# Patient Record
Sex: MALE | Race: WHITE | Employment: OTHER
[De-identification: names, ages, dates, MRNs, and addresses within clinical notes are randomized per-mention and may not be internally consistent; named-entity substitution may affect disease eponyms.]

---

## 2017-01-01 ENCOUNTER — HOME CARE VISIT (OUTPATIENT)
Dept: SCHEDULING | Facility: HOME HEALTH | Age: 82
End: 2017-01-01
Payer: MEDICARE

## 2017-01-01 ENCOUNTER — HOME HEALTH ADMISSION (OUTPATIENT)
Dept: HOME HEALTH SERVICES | Facility: HOME HEALTH | Age: 82
End: 2017-01-01
Payer: MEDICARE

## 2017-01-01 ENCOUNTER — APPOINTMENT (OUTPATIENT)
Dept: CT IMAGING | Age: 82
End: 2017-01-01
Attending: EMERGENCY MEDICINE
Payer: MEDICARE

## 2017-01-01 ENCOUNTER — HOSPITAL ENCOUNTER (OUTPATIENT)
Age: 82
Setting detail: OBSERVATION
Discharge: HOME HEALTH | End: 2017-03-28
Attending: EMERGENCY MEDICINE | Admitting: INTERNAL MEDICINE
Payer: MEDICARE

## 2017-01-01 ENCOUNTER — APPOINTMENT (OUTPATIENT)
Dept: GENERAL RADIOLOGY | Age: 82
End: 2017-01-01
Attending: EMERGENCY MEDICINE
Payer: MEDICARE

## 2017-01-01 VITALS
DIASTOLIC BLOOD PRESSURE: 70 MMHG | HEIGHT: 69 IN | TEMPERATURE: 97.3 F | HEART RATE: 71 BPM | RESPIRATION RATE: 22 BRPM | BODY MASS INDEX: 24.94 KG/M2 | OXYGEN SATURATION: 91 % | WEIGHT: 168.4 LBS | SYSTOLIC BLOOD PRESSURE: 113 MMHG

## 2017-01-01 VITALS
RESPIRATION RATE: 16 BRPM | HEART RATE: 70 BPM | SYSTOLIC BLOOD PRESSURE: 106 MMHG | OXYGEN SATURATION: 96 % | DIASTOLIC BLOOD PRESSURE: 64 MMHG | TEMPERATURE: 97 F

## 2017-01-01 DIAGNOSIS — R53.81 DEBILITY: Primary | ICD-10-CM

## 2017-01-01 DIAGNOSIS — R53.1 WEAKNESS: ICD-10-CM

## 2017-01-01 LAB
ALBUMIN SERPL BCP-MCNC: 3 G/DL (ref 3.2–4.6)
ALBUMIN/GLOB SERPL: 0.6 {RATIO} (ref 1.2–3.5)
ALP SERPL-CCNC: 227 U/L (ref 50–136)
ALT SERPL-CCNC: 19 U/L (ref 12–65)
ANION GAP BLD CALC-SCNC: 11 MMOL/L (ref 7–16)
ANION GAP BLD CALC-SCNC: 11 MMOL/L (ref 7–16)
ANION GAP BLD CALC-SCNC: 13 MMOL/L (ref 7–16)
AST SERPL W P-5'-P-CCNC: 60 U/L (ref 15–37)
ATRIAL RATE: 77 BPM
BACTERIA URNS QL MICRO: 0 /HPF
BASOPHILS # BLD AUTO: 0 K/UL (ref 0–0.2)
BASOPHILS # BLD: 0 % (ref 0–2)
BILIRUB SERPL-MCNC: 0.8 MG/DL (ref 0.2–1.1)
BNP SERPL-MCNC: 125 PG/ML
BUN SERPL-MCNC: 20 MG/DL (ref 8–23)
BUN SERPL-MCNC: 22 MG/DL (ref 8–23)
BUN SERPL-MCNC: 25 MG/DL (ref 8–23)
CALCIUM SERPL-MCNC: 9 MG/DL (ref 8.3–10.4)
CALCIUM SERPL-MCNC: 9.2 MG/DL (ref 8.3–10.4)
CALCIUM SERPL-MCNC: 9.5 MG/DL (ref 8.3–10.4)
CALCULATED P AXIS, ECG09: 35 DEGREES
CALCULATED R AXIS, ECG10: -102 DEGREES
CALCULATED T AXIS, ECG11: 77 DEGREES
CASTS URNS QL MICRO: NORMAL /LPF
CHLORIDE SERPL-SCNC: 104 MMOL/L (ref 98–107)
CHLORIDE SERPL-SCNC: 104 MMOL/L (ref 98–107)
CHLORIDE SERPL-SCNC: 107 MMOL/L (ref 98–107)
CO2 SERPL-SCNC: 22 MMOL/L (ref 21–32)
CO2 SERPL-SCNC: 23 MMOL/L (ref 21–32)
CO2 SERPL-SCNC: 24 MMOL/L (ref 21–32)
CREAT SERPL-MCNC: 1.52 MG/DL (ref 0.8–1.5)
CREAT SERPL-MCNC: 1.63 MG/DL (ref 0.8–1.5)
CREAT SERPL-MCNC: 1.76 MG/DL (ref 0.8–1.5)
DIAGNOSIS, 93000: NORMAL
DIFFERENTIAL METHOD BLD: ABNORMAL
EOSINOPHIL # BLD: 0 K/UL (ref 0–0.8)
EOSINOPHIL NFR BLD: 1 % (ref 0.5–7.8)
EPI CELLS #/AREA URNS HPF: NORMAL /HPF
ERYTHROCYTE [DISTWIDTH] IN BLOOD BY AUTOMATED COUNT: 13.1 % (ref 11.9–14.6)
ERYTHROCYTE [DISTWIDTH] IN BLOOD BY AUTOMATED COUNT: 13.1 % (ref 11.9–14.6)
ERYTHROCYTE [SEDIMENTATION RATE] IN BLOOD: 87 MM/HR (ref 0–30)
GLOBULIN SER CALC-MCNC: 4.7 G/DL (ref 2.3–3.5)
GLUCOSE SERPL-MCNC: 76 MG/DL (ref 65–100)
GLUCOSE SERPL-MCNC: 77 MG/DL (ref 65–100)
GLUCOSE SERPL-MCNC: 80 MG/DL (ref 65–100)
HCT VFR BLD AUTO: 43 % (ref 41.1–50.3)
HCT VFR BLD AUTO: 43.2 % (ref 41.1–50.3)
HGB BLD-MCNC: 14.5 G/DL (ref 13.6–17.2)
HGB BLD-MCNC: 14.6 G/DL (ref 13.6–17.2)
IMM GRANULOCYTES # BLD: 0 K/UL (ref 0–0.5)
IMM GRANULOCYTES NFR BLD AUTO: 0.1 % (ref 0–5)
LYMPHOCYTES # BLD AUTO: 38 % (ref 13–44)
LYMPHOCYTES # BLD: 2.5 K/UL (ref 0.5–4.6)
MCH RBC QN AUTO: 31.5 PG (ref 26.1–32.9)
MCH RBC QN AUTO: 31.7 PG (ref 26.1–32.9)
MCHC RBC AUTO-ENTMCNC: 33.6 G/DL (ref 31.4–35)
MCHC RBC AUTO-ENTMCNC: 34 G/DL (ref 31.4–35)
MCV RBC AUTO: 93.5 FL (ref 79.6–97.8)
MCV RBC AUTO: 93.7 FL (ref 79.6–97.8)
MM INDURATION POC: NORMAL MM (ref 0–5)
MONOCYTES # BLD: 0.6 K/UL (ref 0.1–1.3)
MONOCYTES NFR BLD AUTO: 9 % (ref 4–12)
NEUTS SEG # BLD: 3.5 K/UL (ref 1.7–8.2)
NEUTS SEG NFR BLD AUTO: 52 % (ref 43–78)
P-R INTERVAL, ECG05: 222 MS
PLATELET # BLD AUTO: 125 K/UL (ref 150–450)
PLATELET # BLD AUTO: 137 K/UL (ref 150–450)
PMV BLD AUTO: 10.6 FL (ref 10.8–14.1)
PMV BLD AUTO: 10.7 FL (ref 10.8–14.1)
POTASSIUM SERPL-SCNC: 3.8 MMOL/L (ref 3.5–5.1)
POTASSIUM SERPL-SCNC: 3.9 MMOL/L (ref 3.5–5.1)
POTASSIUM SERPL-SCNC: 4 MMOL/L (ref 3.5–5.1)
PPD POC: NORMAL NEGATIVE
PROT SERPL-MCNC: 7.7 G/DL (ref 6.3–8.2)
Q-T INTERVAL, ECG07: 470 MS
QRS DURATION, ECG06: 204 MS
QTC CALCULATION (BEZET), ECG08: 531 MS
RBC # BLD AUTO: 4.6 M/UL (ref 4.23–5.67)
RBC # BLD AUTO: 4.61 M/UL (ref 4.23–5.67)
RBC #/AREA URNS HPF: NORMAL /HPF
SODIUM SERPL-SCNC: 139 MMOL/L (ref 136–145)
SODIUM SERPL-SCNC: 139 MMOL/L (ref 136–145)
SODIUM SERPL-SCNC: 141 MMOL/L (ref 136–145)
VENTRICULAR RATE, ECG03: 77 BPM
WBC # BLD AUTO: 6.7 K/UL (ref 4.3–11.1)
WBC # BLD AUTO: 8.2 K/UL (ref 4.3–11.1)
WBC URNS QL MICRO: NORMAL /HPF

## 2017-01-01 PROCEDURE — 81015 MICROSCOPIC EXAM OF URINE: CPT | Performed by: EMERGENCY MEDICINE

## 2017-01-01 PROCEDURE — G8979 MOBILITY GOAL STATUS: HCPCS

## 2017-01-01 PROCEDURE — G8980 MOBILITY D/C STATUS: HCPCS

## 2017-01-01 PROCEDURE — 3331090002 HH PPS REVENUE DEBIT

## 2017-01-01 PROCEDURE — 74011250636 HC RX REV CODE- 250/636: Performed by: EMERGENCY MEDICINE

## 2017-01-01 PROCEDURE — 74011250637 HC RX REV CODE- 250/637: Performed by: INTERNAL MEDICINE

## 2017-01-01 PROCEDURE — 3331090001 HH PPS REVENUE CREDIT

## 2017-01-01 PROCEDURE — 77030027138 HC INCENT SPIROMETER -A

## 2017-01-01 PROCEDURE — 80048 BASIC METABOLIC PNL TOTAL CA: CPT | Performed by: INTERNAL MEDICINE

## 2017-01-01 PROCEDURE — G8978 MOBILITY CURRENT STATUS: HCPCS

## 2017-01-01 PROCEDURE — 80053 COMPREHEN METABOLIC PANEL: CPT | Performed by: EMERGENCY MEDICINE

## 2017-01-01 PROCEDURE — G8987 SELF CARE CURRENT STATUS: HCPCS

## 2017-01-01 PROCEDURE — 85652 RBC SED RATE AUTOMATED: CPT | Performed by: EMERGENCY MEDICINE

## 2017-01-01 PROCEDURE — 83880 ASSAY OF NATRIURETIC PEPTIDE: CPT | Performed by: EMERGENCY MEDICINE

## 2017-01-01 PROCEDURE — 99285 EMERGENCY DEPT VISIT HI MDM: CPT | Performed by: EMERGENCY MEDICINE

## 2017-01-01 PROCEDURE — 99218 HC RM OBSERVATION: CPT

## 2017-01-01 PROCEDURE — G0155 HHCP-SVS OF CSW,EA 15 MIN: HCPCS

## 2017-01-01 PROCEDURE — 74011000258 HC RX REV CODE- 258: Performed by: INTERNAL MEDICINE

## 2017-01-01 PROCEDURE — 36415 COLL VENOUS BLD VENIPUNCTURE: CPT | Performed by: INTERNAL MEDICINE

## 2017-01-01 PROCEDURE — 85025 COMPLETE CBC W/AUTO DIFF WBC: CPT | Performed by: EMERGENCY MEDICINE

## 2017-01-01 PROCEDURE — 93005 ELECTROCARDIOGRAM TRACING: CPT | Performed by: EMERGENCY MEDICINE

## 2017-01-01 PROCEDURE — 85027 COMPLETE CBC AUTOMATED: CPT | Performed by: INTERNAL MEDICINE

## 2017-01-01 PROCEDURE — G8988 SELF CARE GOAL STATUS: HCPCS

## 2017-01-01 PROCEDURE — 3331090003 HH PPS REVENUE ADJ

## 2017-01-01 PROCEDURE — G0299 HHS/HOSPICE OF RN EA 15 MIN: HCPCS

## 2017-01-01 PROCEDURE — 97530 THERAPEUTIC ACTIVITIES: CPT

## 2017-01-01 PROCEDURE — 74011000302 HC RX REV CODE- 302: Performed by: INTERNAL MEDICINE

## 2017-01-01 PROCEDURE — 71020 XR CHEST PA LAT: CPT

## 2017-01-01 PROCEDURE — 70450 CT HEAD/BRAIN W/O DYE: CPT

## 2017-01-01 PROCEDURE — 96360 HYDRATION IV INFUSION INIT: CPT | Performed by: EMERGENCY MEDICINE

## 2017-01-01 PROCEDURE — 400013 HH SOC

## 2017-01-01 PROCEDURE — 86580 TB INTRADERMAL TEST: CPT | Performed by: INTERNAL MEDICINE

## 2017-01-01 PROCEDURE — 97166 OT EVAL MOD COMPLEX 45 MIN: CPT

## 2017-01-01 PROCEDURE — 96361 HYDRATE IV INFUSION ADD-ON: CPT | Performed by: EMERGENCY MEDICINE

## 2017-01-01 PROCEDURE — 81003 URINALYSIS AUTO W/O SCOPE: CPT | Performed by: EMERGENCY MEDICINE

## 2017-01-01 PROCEDURE — 97162 PT EVAL MOD COMPLEX 30 MIN: CPT

## 2017-01-01 RX ORDER — ACETAMINOPHEN 325 MG/1
650 TABLET ORAL
Status: DISCONTINUED | OUTPATIENT
Start: 2017-01-01 | End: 2017-01-01 | Stop reason: HOSPADM

## 2017-01-01 RX ORDER — PRAVASTATIN SODIUM 20 MG/1
20 TABLET ORAL
Status: DISCONTINUED | OUTPATIENT
Start: 2017-01-01 | End: 2017-01-01 | Stop reason: HOSPADM

## 2017-01-01 RX ORDER — ONDANSETRON 2 MG/ML
4 INJECTION INTRAMUSCULAR; INTRAVENOUS
Status: DISCONTINUED | OUTPATIENT
Start: 2017-01-01 | End: 2017-01-01 | Stop reason: HOSPADM

## 2017-01-01 RX ORDER — DEXTROSE MONOHYDRATE AND SODIUM CHLORIDE 5; .45 G/100ML; G/100ML
50 INJECTION, SOLUTION INTRAVENOUS CONTINUOUS
Status: DISCONTINUED | OUTPATIENT
Start: 2017-01-01 | End: 2017-01-01 | Stop reason: HOSPADM

## 2017-01-01 RX ORDER — ASPIRIN 81 MG/1
81 TABLET ORAL DAILY
Status: DISCONTINUED | OUTPATIENT
Start: 2017-01-01 | End: 2017-01-01 | Stop reason: HOSPADM

## 2017-01-01 RX ORDER — CARVEDILOL 3.12 MG/1
3.12 TABLET ORAL 2 TIMES DAILY WITH MEALS
Status: DISCONTINUED | OUTPATIENT
Start: 2017-01-01 | End: 2017-01-01 | Stop reason: HOSPADM

## 2017-01-01 RX ORDER — MIRTAZAPINE 15 MG/1
15 TABLET, FILM COATED ORAL
Status: DISCONTINUED | OUTPATIENT
Start: 2017-01-01 | End: 2017-01-01

## 2017-01-01 RX ORDER — SODIUM CHLORIDE 0.9 % (FLUSH) 0.9 %
5-10 SYRINGE (ML) INJECTION AS NEEDED
Status: DISCONTINUED | OUTPATIENT
Start: 2017-01-01 | End: 2017-01-01 | Stop reason: HOSPADM

## 2017-01-01 RX ORDER — SODIUM CHLORIDE 0.9 % (FLUSH) 0.9 %
5-10 SYRINGE (ML) INJECTION EVERY 8 HOURS
Status: DISCONTINUED | OUTPATIENT
Start: 2017-01-01 | End: 2017-01-01 | Stop reason: HOSPADM

## 2017-01-01 RX ADMIN — CARVEDILOL 3.12 MG: 3.12 TABLET, FILM COATED ORAL at 09:15

## 2017-01-01 RX ADMIN — PRAVASTATIN SODIUM 20 MG: 20 TABLET ORAL at 20:27

## 2017-01-01 RX ADMIN — APIXABAN 2.5 MG: 2.5 TABLET, FILM COATED ORAL at 20:27

## 2017-01-01 RX ADMIN — PSYLLIUM HUSK 1 PACKET: 3.4 POWDER ORAL at 17:45

## 2017-01-01 RX ADMIN — APIXABAN 2.5 MG: 2.5 TABLET, FILM COATED ORAL at 09:15

## 2017-01-01 RX ADMIN — Medication 10 ML: at 22:43

## 2017-01-01 RX ADMIN — TUBERCULIN PURIFIED PROTEIN DERIVATIVE 5 UNITS: 5 INJECTION INTRADERMAL at 09:13

## 2017-01-01 RX ADMIN — ASPIRIN 81 MG: 81 TABLET, COATED ORAL at 08:13

## 2017-01-01 RX ADMIN — PSYLLIUM HUSK 1 PACKET: 3.4 POWDER ORAL at 08:13

## 2017-01-01 RX ADMIN — SODIUM CHLORIDE 1000 ML: 900 INJECTION, SOLUTION INTRAVENOUS at 16:11

## 2017-01-01 RX ADMIN — ACETAMINOPHEN 650 MG: 325 TABLET ORAL at 20:28

## 2017-01-01 RX ADMIN — APIXABAN 2.5 MG: 2.5 TABLET, FILM COATED ORAL at 22:36

## 2017-01-01 RX ADMIN — APIXABAN 2.5 MG: 2.5 TABLET, FILM COATED ORAL at 08:13

## 2017-01-01 RX ADMIN — DEXTROSE MONOHYDRATE AND SODIUM CHLORIDE 50 ML/HR: 5; .45 INJECTION, SOLUTION INTRAVENOUS at 08:15

## 2017-01-01 RX ADMIN — DEXTROSE MONOHYDRATE AND SODIUM CHLORIDE 50 ML/HR: 5; .45 INJECTION, SOLUTION INTRAVENOUS at 22:43

## 2017-01-01 RX ADMIN — PRAVASTATIN SODIUM 20 MG: 20 TABLET ORAL at 22:36

## 2017-01-01 RX ADMIN — CARVEDILOL 3.12 MG: 3.12 TABLET, FILM COATED ORAL at 08:13

## 2017-01-01 RX ADMIN — ASPIRIN 81 MG: 81 TABLET, COATED ORAL at 09:15

## 2017-02-06 PROBLEM — R26.89 BALANCE PROBLEM: Status: ACTIVE | Noted: 2017-01-01

## 2017-02-06 PROBLEM — R53.81 DEBILITY: Status: ACTIVE | Noted: 2017-01-01

## 2017-02-13 PROBLEM — I48.0 PAROXYSMAL ATRIAL FIBRILLATION (HCC): Status: ACTIVE | Noted: 2017-01-01

## 2017-03-22 PROBLEM — G47.01 INSOMNIA DUE TO MEDICAL CONDITION: Status: ACTIVE | Noted: 2017-01-01

## 2017-03-22 PROBLEM — F32.A DEPRESSION: Status: ACTIVE | Noted: 2017-01-01

## 2017-03-26 NOTE — ED NOTES
Patient awake, A&O x3. VSS. Patient states that his left neck is painful, states he slept in a chair last night and thinks he may have slept wrong. Patient also complains of left leg weakness. Patient denies chest pain, SOB, N/V. Family with patient states that he has become weaker than normal in the last couple of days. Family feels that his speech is more difficult to understand and notes that several days ago he was leaning to the right when ambulating, today states he was leaning to the left when ambulating. Uses cane at home.

## 2017-03-26 NOTE — ED TRIAGE NOTES
Pt reports having neck pain and left leg pain. Pt states he slept in a recliner last night.  Family states the pt has has a decline in normal activities and has been more weak than normal.

## 2017-03-26 NOTE — IP AVS SNAPSHOT
Dante Sandoval 
 
 
 [address] 
[phone] Patient: Quinton Mccoy MRN: [id number] You are allergic to the following Allergen Reactions Niacin Unknown (comments) Nitroglycerin Unknown (comments) Immunizations Administered for This Admission Name Date  
 TB Skin Test (PPD) Intradermal 3/27/2017 Recent Documentation Height Weight BMI Smoking Status 1.753 m 76.4 kg 24.87 kg/m2 Never Smoker Emergency Contacts Name Discharge Info Relation Home Work Mobile Don Quintana  Spouse [3] [phone] Goyo Castanon  Child [2] [phone] Florencia Lee  Child [2] [phone] About your hospitalization You were admitted on:  2017 You last received care in the:  [facility] 2 SURGICAL You were discharged on:  2017 Unit phone number:  [phone] Why you were hospitalized Your primary diagnosis was:  Debility Your diagnoses also included:  Depression, Htn (Hypertension), Benign Providers Seen During Your Hospitalizations Provider Role Specialty Primary office phone Slime Alvarado MD Attending Provider Emergency Medicine [phone] Natalya Armendariz MD Attending Provider Internal Medicine [phone] Your Primary Care Physician (PCP) Primary Care Physician Office Phone Office Fax [address] [place] [address], [address] [facility] [phone] Follow-up Information Follow up With Details Comments Contact Info Alexi Pérez MD On 3/30/2017 1130 am [address] [place] [address] 
[phone] Your Appointments  11:30 AM EDT [facility] ([address] [place] [address]) [address] [place] [address] [place] [address]  
[phone]  2017  2:00 PM EDT  
 Annual Wellness Exam with Mackenzie Parker [facility] Internal Medicine ([address] [place] [address]) [address] [facility] [place] [address]  
[phone] Friday April 07, 2017  2:30 PM EDT  
6 MONTH with Norman Muniz MD  
[facility] Internal Medicine ([address] [place] [address]) [address] [facility] [place] [address]  
[phone] Current Discharge Medication List  
  
CONTINUE these medications which have NOT CHANGED Dose & Instructions Dispensing Information Comments Morning Noon Evening Bedtime  
 apixaban 2.5 mg tablet Commonly known as:  Libby Miller Dose:  2.5 mg Take 1 Tab by mouth two (2) times a day. Quantity:  60 Tab Refills:  11  
     
  
   
   
  
   
  
 aspirin delayed-release 81 mg tablet Dose:  81 mg Take 81 mg by mouth daily. Refills:  0  
     
  
   
   
   
  
 carvedilol 3.125 mg tablet Commonly known as:  Haskel Sarabjit Dose:  3.125 mg Take 1 Tab by mouth two (2) times daily (with meals). Quantity:  180 Tab Refills:  3 cholecalciferol 1,000 unit Cap Commonly known as:  VITAMIN D3 Dose:  1000 Units Take 1,000 Units by mouth daily. Refills:  0  
     
  
   
   
   
  
 furosemide 40 mg tablet Commonly known as:  LASIX Notes to Patient:  As needed and instructed Dose:  40 mg Take 1 Tab by mouth as needed. Quantity:  30 Tab Refills:  6  
     
   
   
   
  
 pravastatin 20 mg tablet Commonly known as:  PRAVACHOL Dose:  20 mg Take 1 Tab by mouth nightly. Quantity:  90 Tab Refills:  3 ZyrTEC 10 mg tablet Generic drug:  cetirizine Take  by mouth. Refills:  0 STOP taking these medications   
 mirtazapine 15 mg tablet Commonly known as:  Indra Amaro Discharge Instructions DISCHARGE SUMMARY from Nurse The following personal items are in your possession at time of discharge: 
 
Dental Appliances: With patient Visual Aid: Glasses Hearing Aids/Status: Bilateral, With patient Home Medications: None Jewelry: None Clothing: At bedside Other Valuables: Other (comment) (bilateral hearing aids) PATIENT INSTRUCTIONS: 
 
After general anesthesia or intravenous sedation, for 24 hours or while taking prescription Narcotics: · Limit your activities · Do not drive and operate hazardous machinery · Do not make important personal or business decisions · Do  not drink alcoholic beverages · If you have not urinated within 8 hours after discharge, please contact your surgeon on call. Report the following to your surgeon: 
· Excessive pain, swelling, redness or odor of or around the surgical area · Temperature over 100.5 · Nausea and vomiting lasting longer than 4 hours or if unable to take medications · Any signs of decreased circulation or nerve impairment to extremity: change in color, persistent  numbness, tingling, coldness or increase pain · Any questions What to do at Home: 
Recommended activity: Activity as tolerated, per MD instructions If you experience any of the following symptoms fever > 100.5, nausea, vomiting, pain, chest pain, shortness of breath please follow up with MD. 
 
 
*  Please give a list of your current medications to your Primary Care Provider. *  Please update this list whenever your medications are discontinued, doses are 
    changed, or new medications (including over-the-counter products) are added. *  Please carry medication information at all times in case of emergency situations. These are general instructions for a healthy lifestyle: No smoking/ No tobacco products/ Avoid exposure to second hand smoke Surgeon General's Warning:  Quitting smoking now greatly reduces serious risk to your health. Obesity, smoking, and sedentary lifestyle greatly increases your risk for illness A healthy diet, regular physical exercise & weight monitoring are important for maintaining a healthy lifestyle You may be retaining fluid if you have a history of heart failure or if you experience any of the following symptoms:  Weight gain of 3 pounds or more overnight or 5 pounds in a week, increased swelling in our hands or feet or shortness of breath while lying flat in bed. Please call your doctor as soon as you notice any of these symptoms; do not wait until your next office visit. Recognize signs and symptoms of STROKE: 
 
F-face looks uneven A-arms unable to move or move unevenly S-speech slurred or non-existent T-time-call 911 as soon as signs and symptoms begin-DO NOT go Back to bed or wait to see if you get better-TIME IS BRAIN. Warning Signs of HEART ATTACK Call 911 if you have these symptoms: 
? Chest discomfort. Most heart attacks involve discomfort in the center of the chest that lasts more than a few minutes, or that goes away and comes back. It can feel like uncomfortable pressure, squeezing, fullness, or pain. ? Discomfort in other areas of the upper body. Symptoms can include pain or discomfort in one or both arms, the back, neck, jaw, or stomach. ? Shortness of breath with or without chest discomfort. ? Other signs may include breaking out in a cold sweat, nausea, or lightheadedness. Don't wait more than five minutes to call [address]! Fast action can save your life. Calling 911 is almost always the fastest way to get lifesaving treatment. Emergency Medical Services staff can begin treatment when they arrive  up to an hour sooner than if someone gets to the hospital by car. The discharge information has been reviewed with the patient. The patient verbalized understanding.  
 
Discharge medications reviewed with the patient and appropriate educational materials and side effects teaching were provided. Weakness: Care Instructions Your Care Instructions Weakness is a lack of physical or muscle strength. You may feel that you need to make extra effort to move your arms, legs, or other muscles. Generalized weakness means that you feel weak in most areas of your body. Another type of weakness may affect just one muscle or group of muscles. You may feel weak and tired after you have done too much activity, such as taking an extra-long hike. This is not a serious problem. It often goes away on its own. Feeling weak can also be caused by medical conditions like thyroid problems, depression, or a virus. Sometimes the cause can be serious. Your doctor may want to do more tests to try to find the cause of the weakness. The doctor has checked you carefully, but problems can develop later. If you notice any problems or new symptoms, get medical treatment right away. Follow-up care is a key part of your treatment and safety. Be sure to make and go to all appointments, and call your doctor if you are having problems. It's also a good idea to know your test results and keep a list of the medicines you take. How can you care for yourself at home? · Rest when you feel tired. · Be safe with medicines. If your doctor prescribed medicine, take it exactly as prescribed. Call your doctor if you think you are having a problem with your medicine. You will get more details on the specific medicines your doctor prescribes. · Do not skip meals. Eating a balanced diet may increase your energy level. · Get some physical activity every day, but do not get too tired. When should you call for help? Call your doctor now or seek immediate medical care if: 
· You have new or worse weakness. · You are dizzy or lightheaded, or you feel like you may faint. Watch closely for changes in your health, and be sure to contact your doctor if: · You do not get better as expected. Where can you learn more? Go to http://mauricio-niles.info/. Enter 019 [id number] [id number] in the search box to learn more about \"Weakness: Care Instructions. \" Current as of: May 27, 2016 Content Version: 11.1 © 8312-7351 [employer]. Care instructions adapted under license by [employer] (which disclaims liability or warranty for this information). If you have questions about a medical condition or this instruction, always ask your healthcare professional. [address] any warranty or liability for your use of this information. Discharge Orders None PreciouStatus Announcement We are excited to announce that we are making your provider's discharge notes available to you in PreciouStatus. You will see these notes when they are completed and signed by the physician that discharged you from your recent hospital stay. If you have any questions or concerns about any information you see in PreciouStatus, please call the Health Information Department where you were seen or reach out to your Primary Care Provider for more information about your plan of care. Introducing [facility]! [employer] introduces PreciouStatus patient portal. Now you can access parts of your medical record, email your doctor's office, and request medication refills online. 1. In your internet browser, go to [email or web address] 2. Click on the First Time User? Click Here link in the Sign In box. You will see the New Member Sign Up page. 3. Enter your PreciouStatus Access Code exactly as it appears below. You will not need to use this code after youve completed the sign-up process. If you do not sign up before the expiration date, you must request a new code. · PreciouStatus Access Code: [email or web address] Expires: 4/17/2017  4:51 PM 
 
4.  Enter the last four digits of your Social Security Number (xxxx) and Date of Birth (mm/dd/yyyy) as indicated and click Submit. You will be taken to the next sign-up page. 5. Create a Zilliant ID. This will be your Zilliant login ID and cannot be changed, so think of one that is secure and easy to remember. 6. Create a Zilliant password. You can change your password at any time. 7. Enter your Password Reset Question and Answer. This can be used at a later time if you forget your password. 8. Enter your e-mail address. You will receive e-mail notification when new information is available in [address] 9. Click Sign Up. You can now view and download portions of your medical record. 10. Click the Download Summary menu link to download a portable copy of your medical information. If you have questions, please visit the Frequently Asked Questions section of the Zilliant website. Remember, Zilliant is NOT to be used for urgent needs. For medical emergencies, dial 911. Now available from your iPhone and Android! General Information Please provide this summary of care documentation to your next provider. Patient Signature:  ____________________________________________________________ Date:  ____________________________________________________________  
  
[facility] Provider Signature:  ____________________________________________________________ Date:  ____________________________________________________________

## 2017-03-26 NOTE — ED PROVIDER NOTES
HPI Comments: For about 8-10 days he has had increasing weakness. He initially was walking with a cane assistance but has progressed to using a walker and today is barely able to stand on his own. Son states that he thought his left leg was weaker today than it has been. He did have a stroke in year 2008 approximately at same time as he had bypass grafting done. He is known to have a low ejection fraction and the son states that he thinks it is 22%. Patient denies any chest pain. He has global fatigue and recently he has been sleeping in a recliner chair. No significant dependent edema has been noted. he lives with his son who is a  patient is a retired  and has a 20-year-old spouse that he assist somewhat in her care. Patient is a 80 y.o. male presenting with fatigue. The history is provided by the patient. Fatigue   This is a new problem.         Past Medical History:   Diagnosis Date    Anxiety     Congestive heart failure (CHF) (HCC)     Depression     Hemorrhoids     HTN (hypertension), benign     Kidney stones     Myocardial infarction ([facility] [address]     Stroke [facility])        Past Surgical History:   Procedure Laterality Date    HX ANGIOPLASTY  2008    HX CATARACT REMOVAL Bilateral 2012    HX CHOLECYSTECTOMY  1992    HX CORONARY ARTERY BYPASS GRAFT  2008    HX HEENT  [id number]    sinus    HX LITHOTRIPSY  late 1970's    HX OTHER SURGICAL  2013    skin cancer removed    HX PACEMAKER PLACEMENT  2009    HX TONSILLECTOMY  1930's         Family History:   Problem Relation Age of Onset   Latrell Tomlin Breast Cancer Mother     Heart Disease Father     Heart Attack Father     Glaucoma Sister     Coronary Artery Disease Neg Hx        Social History     Social History    Marital status:      Spouse name: N/A    Number of children: N/A    Years of education: N/A     Occupational History    retired      Social History Main Topics    Smoking status: Never Smoker    Smokeless tobacco: Never Used    Alcohol use No    Drug use: No    Sexual activity: Not on file     Other Topics Concern    Caffeine Concern Not Asked     1-2 cups per day    Exercise Not Asked     walks a little     Social History Narrative         ALLERGIES: Niacin and Nitroglycerin    Review of Systems   Constitutional: Positive for fatigue. Negative for chills and fever. Respiratory: Negative. Cardiovascular: Negative. Genitourinary: Negative. Neurological: Negative. All other systems reviewed and are negative. Vitals:    03/26/17 1459   BP: 117/72   Pulse: 87   Resp: 18   Temp: 98.5 °F (36.9 °C)   SpO2: (!) 89%   Weight: 78.9 kg (174 lb)   Height: 5' 9\" (1.753 m)            Physical Exam   Constitutional: He appears well-developed and well-nourished. No distress. Frail and elderly but not toxic or septic  His attention but it poorly engaged in conversation appears very fatigued   HENT:   Head: Atraumatic. Head is without laceration. Mouth/Throat: No oropharyngeal exudate. Somewhat dry mucous membranes   Eyes: No scleral icterus. Neck: Normal range of motion. Neck supple. Cardiovascular: Normal rate. Pulmonary/Chest: Effort normal. No respiratory distress. He has no wheezes. Abdominal: Soft. There is no tenderness. Musculoskeletal: Normal range of motion. He exhibits no edema or deformity. Neurological: He is alert. He exhibits normal muscle tone. Coordination normal.   Skin: Skin is warm and dry. Psychiatric: His behavior is normal. Thought content normal.   Nursing note and vitals reviewed. MDM  Number of Diagnoses or Management Options  Diagnosis management comments: With a fairly dramatic decline in functional capabilities over just over 1 week. During this time he has gone from ability to walk quite freely with just the assistance as needed from a cane to unable to stand. Does not have a definite focal deficit.   Studies are department do not define a cause for this.       Amount and/or Complexity of Data Reviewed  Clinical lab tests: ordered and reviewed  Tests in the radiology section of CPT®: reviewed and ordered  Obtain history from someone other than the patient: yes (son)  Discuss the patient with other providers: yes (Dr. Asaf Arnett)  Independent visualization of images, tracings, or specimens: yes    Risk of Complications, Morbidity, and/or Mortality  Presenting problems: high  Diagnostic procedures: low  Management options: high    Patient Progress  Patient progress: stable    ED Course       Procedures    Recent Results (from the past 12 hour(s))   CBC WITH AUTOMATED DIFF    Collection Time: 03/26/17  3:15 PM   Result Value Ref Range    WBC 6.7 4.3 - 11.1 K/uL    RBC 4.60 4.23 - 5.67 M/uL    HGB 14.6 13.6 - 17.2 g/dL    HCT 43.0 41.1 - 50.3 %    MCV 93.5 79.6 - 97.8 FL    MCH 31.7 26.1 - 32.9 PG    MCHC 34.0 31.4 - 35.0 g/dL    RDW 13.1 11.9 - 14.6 %    PLATELET 497 (L) 599 - 450 K/uL    MPV 10.6 (L) 10.8 - 14.1 FL    DF AUTOMATED      NEUTROPHILS 52 43 - 78 %    LYMPHOCYTES 38 13 - 44 %    MONOCYTES 9 4.0 - 12.0 %    EOSINOPHILS 1 0.5 - 7.8 %    BASOPHILS 0 0.0 - 2.0 %    IMMATURE GRANULOCYTES 0.1 0.0 - 5.0 %    ABS. NEUTROPHILS 3.5 1.7 - 8.2 K/UL    ABS. LYMPHOCYTES 2.5 0.5 - 4.6 K/UL    ABS. MONOCYTES 0.6 0.1 - 1.3 K/UL    ABS. EOSINOPHILS 0.0 0.0 - 0.8 K/UL    ABS. BASOPHILS 0.0 0.0 - 0.2 K/UL    ABS. IMM.  GRANS. 0.0 0.0 - 0.5 K/UL   METABOLIC PANEL, COMPREHENSIVE    Collection Time: 03/26/17  3:15 PM   Result Value Ref Range    Sodium 139 136 - 145 mmol/L    Potassium 3.9 3.5 - 5.1 mmol/L    Chloride 104 98 - 107 mmol/L    CO2 24 21 - 32 mmol/L    Anion gap 11 7 - 16 mmol/L    Glucose 80 65 - 100 mg/dL    BUN 25 (H) 8 - 23 MG/DL    Creatinine 1.76 (H) 0.8 - 1.5 MG/DL    GFR est AA 47 (L) >60 ml/min/1.73m2    GFR est non-AA 39 (L) >60 ml/min/1.73m2    Calcium 9.5 8.3 - 10.4 MG/DL    Bilirubin, total 0.8 0.2 - 1.1 MG/DL    ALT (SGPT) 19 12 - 65 U/L    AST (SGOT) 60 (H) 15 - 37 U/L    Alk. phosphatase 227 (H) 50 - 136 U/L    Protein, total 7.7 6.3 - 8.2 g/dL    Albumin 3.0 (L) 3.2 - 4.6 g/dL    Globulin 4.7 (H) 2.3 - 3.5 g/dL    A-G Ratio 0.6 (L) 1.2 - 3.5     SED RATE, AUTOMATED    Collection Time: 03/26/17  3:15 PM   Result Value Ref Range    Sed rate, automated 87 (H) 0 - 30 mm/hr   BNP    Collection Time: 03/26/17  3:15 PM   Result Value Ref Range     pg/mL   EKG, 12 LEAD, INITIAL    Collection Time: 03/26/17  4:08 PM   Result Value Ref Range    Ventricular Rate 77 BPM    Atrial Rate 77 BPM    P-R Interval 222 ms    QRS Duration 204 ms    Q-T Interval 470 ms    QTC Calculation (Bezet) 531 ms    Calculated P Axis 35 degrees    Calculated R Axis -102 degrees    Calculated T Axis 77 degrees    Diagnosis       !! AGE AND GENDER SPECIFIC ECG ANALYSIS !!   Sinus rhythm with 1st degree A-V block with Possible Premature atrial   complexes with Aberrant conduction  Non-specific intra-ventricular conduction block  Possible Lateral infarct , age undetermined  Inferior infarct , age undetermined  Abnormal ECG  No previous ECGs available

## 2017-03-27 NOTE — PROGRESS NOTES
Problem: Mobility Impaired (Adult and Pediatric)  Goal: *Acute Goals and Plan of Care (Insert Text)  LTG:  (1.)Mr. Nicolasa Mak will move from supine to sit and sit to supine , scoot up and down and roll side to side in bed with INDEPENDENT within 7 day(s). (2.)Mr. Nicolasa Mak will transfer from bed to chair and chair to bed with stand by assist using the least restrictive device within 7 day(s). (3.)Mr. Nicolasa Mak will ambulate with stand by assist for 250 feet with the least restrictive device within 7 day(s). ________________________________________________________________________________________________      PHYSICAL THERAPY: INITIAL ASSESSMENT, TREATMENT DAY: DAY OF ASSESSMENT, AM 3/27/2017  OBSERVATION: Hospital Day: 2  [place] / Product Type: Medicare /      NAME/AGE/GENDER: Oglitzy Garber is a 80 y.o. male          PRIMARY DIAGNOSIS: general weakness Debility Debility        ICD-10: Treatment Diagnosis:       · Difficulty in walking, Not elsewhere classified (R26.2)   Precaution/Allergies:  Niacin and Nitroglycerin       ASSESSMENT:      Mr. Nicolasa Mak presents supine in bed asleep with son at bedside. He was very talkative and with tangential speech. He transitioned to sit with min assist.  While sitting edge of bed, he tended to lean posteriorly and to left, but not consistently-at times sitting in midline. B LE strength and sensation grossly WNL and equal.  Patient stood with CGA, standing balance fair. Patient ambulated 25' into restroom with min HHA. Patient has declined in functional mobility over past week or so per son. Mr. Nicolasa Mak would benefit from skilled physical therapy (medically necessary) to address his deficits and maximize his function. Patient may benefit from rehab when he leaves acute care. After evaluation, worked on standing balance and gait in room. No significant progress noted.   Mr. Nicolasa Mak was discharged from our facility before further treatment could be provided in this setting. This section established at most recent assessment   PROBLEM LIST (Impairments causing functional limitations):  1. Decreased ADL/Functional Activities  2. Decreased Transfer Abilities  3. Decreased Ambulation Ability/Technique  4. Decreased Balance  5. Decreased Activity Tolerance  6. Decreased Knowledge of Precautions    INTERVENTIONS PLANNED: (Benefits and precautions of physical therapy have been discussed with the patient.)  1. Balance Exercise  2. Bed Mobility  3. Gait Training  4. Therapeutic Activites  5. Therapeutic Exercise/Strengthening  6. Transfer Training  7. education  8. Group Therapy      TREATMENT PLAN: Frequency/Duration: 3 times a week for duration of hospital stay  Rehabilitation Potential For Stated Goals: GOOD      RECOMMENDED REHABILITATION/EQUIPMENT: (at time of discharge pending progress): Continue Skilled Therapy. HISTORY:   History of Present Injury/Illness (Reason for Referral):  Per MD note, \"Patient is a 80 yom with a hx of systolic chf EFof 96-73%, htn, anxiety who presents with increasing debility over the past 7-8 days. Son at bedside states pt has not been able to ambulate which he normally does using a cane. Denies any cp, sob, nausea, vomiting, recent fevers/chills. Pt unable to  ER to evaluate gait. \"  Past Medical History/Comorbidities:   Mr. Yaron Whitman  has a past medical history of Anxiety; Congestive heart failure (CHF) ([address] Depression; Hemorrhoids; HTN (hypertension), benign; Kidney stones; Myocardial infarction [facility]); and Stroke ([address] Mr. Yaron Whitman  has a past surgical history that includes cholecystectomy (1992); angioplasty (2008); coronary artery bypass graft (2008); lithotripsy (late 1970's); cataract removal (Bilateral, 2012); heent (0573-9980); tonsillectomy (1930's); pacemaker placement (2009); and other surgical (2013).   Social History/Living Environment:   Home Environment: Private residence  # Steps to Enter: 3  One/Two Story Residence: One story  Living Alone: No  Support Systems: Family member(s)  Patient Expects to be Discharged to[de-identified] Private residence  Current DME Used/Available at Home: Betty Muro, héctor, Walker, rollator  Prior Level of Function/Work/Activity:  Lives at home with wife, son, and DIL. Patient is normally modified independent with gait in home using cane. Per son, patient has been declining over past 10 days or so. Number of Personal Factors/Comorbidities that affect the Plan of Care: 1-2: MODERATE COMPLEXITY   EXAMINATION:   Most Recent Physical Functioning:   Gross Assessment:  AROM: Generally decreased, functional  Strength: Generally decreased, functional  Sensation: Intact               Posture:  Posture (WDL): Exceptions to WDL  Posture Assessment: Forward head, Rounded shoulders  Balance:  Sitting: Intact  Standing: Impaired Bed Mobility:  Supine to Sit: Minimum assistance  Sit to Supine: Minimum assistance  Wheelchair Mobility:     Transfers:  Sit to Stand: Contact guard assistance  Stand to Sit: Contact guard assistance  Bed to Chair: Minimum assistance  Gait:     Base of Support: Widened  Speed/Lyn: Slow  Step Length: Right shortened;Left shortened  Gait Abnormalities: Altered arm swing  Distance (ft): 18 Feet (ft) (x2)  Assistive Device: Gait belt  Ambulation - Level of Assistance: Minimal assistance       Body Structures Involved:  1. Joints  2. Muscles  3. Ligaments Body Functions Affected:  1. Neuromusculoskeletal  2. Movement Related Activities and Participation Affected:  1. Mobility  2. Self Care  3.  Domestic Life   Number of elements that affect the Plan of Care: 4+: HIGH COMPLEXITY   CLINICAL PRESENTATION:   Presentation: Evolving clinical presentation with changing clinical characteristics: MODERATE COMPLEXITY   CLINICAL DECISION MAKIN [facility] Inpatient Short Form  How much difficulty does the patient currently have. .. Unable A Lot A Little None   1. Turning over in bed (including adjusting bedclothes, sheets and blankets)? [ ] 1   [ ] 2   [X] 3   [ ] 4   2. Sitting down on and standing up from a chair with arms ( e.g., wheelchair, bedside commode, etc.)   [ ] 1   [ ] 2   [X] 3   [ ] 4   3. Moving from lying on back to sitting on the side of the bed? [ ] 1   [ ] 2   [X] 3   [ ] 4   How much help from another person does the patient currently need. .. Total A Lot A Little None   4. Moving to and from a bed to a chair (including a wheelchair)? [ ] 1   [ ] 2   [X] 3   [ ] 4   5. Need to walk in hospital room? [ ] 1   [ ] 2   [X] 3   [ ] 4   6. Climbing 3-5 steps with a railing? [ ] 1   [ ] 2   [X] 3   [ ] 4   © 2007, Trustees of [address] [place], [place] [address], under license to [employer] All rights reserved    Score:  Initial: 18 Most Recent: X (Date: -- )     Interpretation of Tool:  Represents activities that are increasingly more difficult (i.e. Bed mobility, Transfers, Gait). Score 24 23 22-20 19-15 14-10 9-7 6       Modifier CH CI CJ CK CL CM CN         · Mobility - Walking and Moving Around:               - CURRENT STATUS:    CK - 40%-59% impaired, limited or restricted               - GOAL STATUS:           CJ - 20%-39% impaired, limited or restricted               - D/C STATUS:                       CK - 40%-59% impaired, limited or restricted  [place] / Product Type: Medicare /       Medical Necessity:     · Patient is expected to demonstrate progress in strength, range of motion, balance, coordination and functional technique to decrease assistance required with all functional mobility. Reason for Services/Other Comments:  · Patient continues to demonstrate capacity to improve balance and mobility which will increase independence and increase safety.    Use of outcome tool(s) and clinical judgement create a POC that gives a: Questionable prediction of patient's progress: MODERATE COMPLEXITY                 TREATMENT:   (In addition to Assessment/Re-Assessment sessions the following treatments were rendered)   Pre-treatment Symptoms/Complaints:  drowsy  Pain: Initial:   Pain Intensity 1: 0  Post Session:  0      Therapeutic Activity: (    15 minutes): Therapeutic activities including Toilet transfers, Ambulation on level ground and standing balance activities in room to improve mobility, strength, balance and coordination. Required maximal   to promote static and dynamic balance in standing. Braces/Orthotics/Lines/Etc:   · IV  · O2 Device: Nasal cannula  Treatment/Session Assessment:    · Response to Treatment:  See above. · Interdisciplinary Collaboration:  · Physical Therapist  · Registered Nurse  · After treatment position/precautions:  · Supine in bed  · Bed alarm/tab alert on  · Bed/Chair-wheels locked  · Call light within reach  · RN notified  · Family at bedside  · Compliance with Program/Exercises: Will assess as treatment progresses. · Recommendations/Intent for next treatment session: \"Next visit will focus on advancements to more challenging activities and reduction in assistance provided\".   Total Treatment Duration:  PT Patient Time In/Time Out  Time In: 1030  Time Out: [address],

## 2017-03-27 NOTE — PROGRESS NOTES
Spoke to Mr. Afia Monzon and his son Rev. Amina Sigala (his cell is [phone]) in room 221 about discharge planning. Mr. Afia Monzon and his 80year old wife live with Rev. Afia Monzon (son) in [facility] home. Until recently (week or so), Mr. Afia Monzon was fairly independent with ADLs, using a cane for ambulation. We discussed that Mr. Afia Monzon' EF is 15-20%, and that could contribute to his debility and fatigue in a 80year old. We discussed that Mr. Afia Monzon is under \"observation\" status. Son signed the [facility] Outpatient Observation Notice\" with copy left in room and original onto his paper chart. Explained that in order to qualify for STR at SNF, Mr. Afia Monzon would have to have a minimum 3 night stay under medically necessary inpatient status. At this point, Mr. Afia Monzon does not appear to meet the criteria for change to inpatient status. Thus, STR at a SNF is not an option. Will re-convene tomorrow to further discuss.

## 2017-03-27 NOTE — PROGRESS NOTES
Problem: Self Care Deficits Care Plan (Adult)  Goal: *Acute Goals and Plan of Care (Insert Text)  1. Patient will complete lower body bathing and dressing with minimal asssitance and adaptive equipment as needed. 2. Patient will complete toileting with stand by assist.   3. Patient will tolerate 20 minutes of OT treatment with as needed rest breaks to increase activity tolerance for ADLs. 4. Patient will complete functional transfers with supervision and adaptive equipment as needed. 5. Patient will remain alert and follow commands 100% of the time. Timeframe: 7 visits     Comments:       OCCUPATIONAL THERAPY: Initial Assessment and PM 3/27/2017  OBSERVATION: Hospital Day: 2  [place] / Product Type: Medicare /      NAME/AGE/GENDER: Staci Rossi is a 80 y.o. male          PRIMARY DIAGNOSIS:  general weakness Debility Debility        ICD-10: Treatment Diagnosis:        · Generalized Muscle Weakness (M62.81)  · Other lack of cordination (R27.8)  · Localized edema (R60.1)   Precautions/Allergies:         Niacin and Nitroglycerin       ASSESSMENT:      Mr. Shahzad Perez presents to hospital with weakness secondary to CHF. Upon entry of OT, he was supine in bed with wife at bedside. He was agreeable to OT evaluation. Wife helped provide history, although pt was oriented x3. They report that he is independent/mod I at baseline with ADLs, until this past weekend when they noticed a decline in function. Today, Mr. Shahzad Perez required min A to perform bed mobility and min A to perform sit to stand. He fatigued quickly and could not tolerate standing longer than ~1 minute. Per MMT, his UE strength is decreased (3+/5) and his AROM is affected as a result.  Mr. Shahzad Perez presents with decreased safety awareness, decreased insight to deficits, decreased strength, decreased ADL performance, decreased activity tolerance, decreased dynamic sitting balance, decreased static/dynamic standing balance, and decreased functional mobility. He would benefit from continued skilled OT (medically necessary) to improve safety and independence with ADLs within the home. Will follow. This section established at most recent assessment   PROBLEM LIST (Impairments causing functional limitations):  1. Decreased Strength  2. Decreased ADL/Functional Activities  3. Decreased Transfer Abilities  4. Decreased Ambulation Ability/Technique  5. Decreased Balance  6. Decreased Activity Tolerance  7. Decreased Work Simplification/Energy Conservation Techniques  8. Increased Fatigue    INTERVENTIONS PLANNED: (Benefits and precautions of occupational therapy have been discussed with the patient.)  1. Activities of daily living training  2. Adaptive equipment training  3. Group therapy  4. Theraputic activity  5. Theraputic exercise      TREATMENT PLAN: Frequency/Duration: Follow patient 3x/week to address above goals. Rehabilitation Potential For Stated Goals: GOOD      RECOMMENDED REHABILITATION/EQUIPMENT: (at time of discharge pending progress): Continue Skilled Therapy. OCCUPATIONAL PROFILE AND HISTORY:   History of Present Injury/Illness (Reason for Referral):  Per H&P: \"Patient is a 80 yom with a hx of systolic chf EFof 96-66%, htn, anxiety who presents with increasing debility over the past 7-8 days. Son at bedside states pt has not been able to ambulate which he normally does using a cane. Denies any cp, sob, nausea, vomiting, recent fevers/chills. Pt unable to  ER to evaluate gait. \"  Past Medical History/Comorbidities:   Mr. Jorge A Jimenes  has a past medical history of Anxiety; Congestive heart failure (CHF) ([facility] Depression; Hemorrhoids; HTN (hypertension), benign; Kidney stones; Myocardial infarction [facility]); and Stroke ([facility]   Mr. Jorge A Jimenes  has a past surgical history that includes cholecystectomy (1992); angioplasty (2008); coronary artery bypass graft (2008); lithotripsy (late 1970's); cataract removal (Bilateral, 2012); heent (4266-7841); tonsillectomy (1930's); pacemaker placement (2009); and other surgical (2013). Social History/Living Environment:   Home Environment: Private residence  # Steps to Enter: 4  One/Two Story Residence: One story  Living Alone: No  Support Systems: Family member(s), Child(max), Spouse/Significant Other/Partner  Patient Expects to be Discharged to[de-identified] Unknown  Current DME Used/Available at Home: Cane, straight, Walker, rolling  Tub or Shower Type: Shower  Prior Level of Function/Work/Activity:  Lives in [facility] with wife, son, and daughter in law. Independent-mod I at baseline with ADLs. Personal Factors:          Sex:  male        Age:  80 y.o. Social Background:  Strong supportive family        Other factors that influence how disability is experienced by the patient:  CHF   Number of Personal Factors/Comorbidities that affect the Plan of Care: Expanded review of therapy/medical records (1-2):  MODERATE COMPLEXITY   ASSESSMENT OF OCCUPATIONAL PERFORMANCE[de-identified]   Activities of Daily Living:           Basic ADLs (From Assessment) Complex ADLs (From Assessment)   Basic ADL  Feeding: Setup  Oral Facial Hygiene/Grooming: Setup  Bathing: Moderate assistance  Upper Body Dressing: Minimum assistance  Lower Body Dressing: Moderate assistance  Toileting: Minimum assistance Instrumental ADL  Meal Preparation: Total assistance  Homemaking:  Total assistance  Medication Management: Total assistance  Financial Management: Total assistance   Grooming/Bathing/Dressing Activities of Daily Living     Cognitive Retraining  Safety/Judgement: Fall prevention;Home safety                       Bed/Mat Mobility  Supine to Sit: Minimum assistance  Sit to Supine: Minimum assistance  Sit to Stand: Minimum assistance  Bed to Chair: Minimum assistance          Most Recent Physical Functioning:   Gross Assessment:  AROM: Generally decreased, functional (BUE)  Strength: Generally decreased, functional (BUE (3+/5))  Sensation: Intact               Posture:  Posture (WDL): Exceptions to WDL  Posture Assessment: Forward head, Rounded shoulders  Balance:  Sitting: Impaired  Sitting - Static: Good (unsupported)  Sitting - Dynamic: Fair (occasional)  Standing: Impaired  Standing - Static: Fair  Standing - Dynamic : Poor Bed Mobility:  Supine to Sit: Minimum assistance  Sit to Supine: Minimum assistance  Wheelchair Mobility:     Transfers:  Sit to Stand: Minimum assistance  Stand to Sit: Minimum assistance  Bed to Chair: Minimum assistance                    Patient Vitals for the past 6 hrs:       BP BP Patient Position SpO2 Pulse   17 1119 (!) 86/51 Supine 95 % 75   17 1515 100/64 Supine 91 % 74        Mental Status  Neurologic State: Drowsy  Orientation Level: Oriented to place, Oriented to situation, Oriented to person  Cognition: Decreased command following, Decreased attention/concentration  Perception: Appears intact  Perseveration: Perseverates during conversation  Safety/Judgement: Fall prevention, Home safety                               Physical Skills Involved:  1. Balance  2. Mobility  3. Strength  4. Endurance  5. Fine or Gross Motor Coordination Cognitive Skills Affected (resulting in the inability to perform in a timely and safe manner):  1. Attending  2. Problem Solving  3. Mental Sequencing  4. Executive function Psychosocial Skills Affected:  1. Interpersonal Interactions  2. Active Use of Coping Strategies  3. Environmental Adaptations   Number of elements that affect the Plan of Care: 5+:  HIGH COMPLEXITY   CLINICAL DECISION MAKIN [facility] Inpatient Short Form  How much help from another person does the patient currently need. .. Total A Lot A Little None   1. Putting on and taking off regular lower body clothing?   [ ] 1   [X] 2   [ ] 3   [ ] 4   2. Bathing (including washing, rinsing, drying)?    [ ] 1   [X] 2   [ ] 3   [ ] 4 3.  Toileting, which includes using toilet, bedpan or urinal?   [ ] 1   [ ] 2   [X] 3   [ ] 4   4. Putting on and taking off regular upper body clothing?   [ ] 1   [ ] 2   [X] 3   [ ] 4   5. Taking care of personal grooming such as brushing teeth? [ ] 1   [ ] 2   [X] 3   [ ] 4   6. Eating meals? [ ] 1   [ ] 2   [X] 3   [ ] 4   © 2007, Trustees of [address] [place] [address] [address] [id number], under license to [employer]. All rights reserved    Score:  Initial: 16 Most Recent: X (Date: -- )     Interpretation of Tool:  Represents activities that are increasingly more difficult (i.e. Bed mobility, Transfers, Gait). Score 24 23 22-20 19-15 14-10 9-7 6       Modifier CH CI CJ CK CL CM CN         · Self Care:               - CURRENT STATUS:    CK - 40%-59% impaired, limited or restricted               - GOAL STATUS:           CJ - 20%-39% impaired, limited or restricted               - D/C STATUS:                       ---------------To be determined---------------  [place] / Product Type: Medicare /       Medical Necessity:     · Patient is expected to demonstrate progress in strength, balance and functional technique to decrease assistance required with ADLs. .  Reason for Services/Other Comments:  · Patient continues to require present interventions due to patient's inability to complete ADLs at baseline. .   Use of outcome tool(s) and clinical judgement create a POC that gives a: MODERATE COMPLEXITY             TREATMENT:   (In addition to Assessment/Re-Assessment sessions the following treatments were rendered)      Pre-treatment Symptoms/Complaints:    Pain: Initial:   Pain Intensity 1: 0  Post Session:  0/10      Assessment/Reassessment only, no treatment provided today     Braces/Orthotics/Lines/Etc:   · IV  · room air  Treatment/Session Assessment:    · Response to Treatment:  Tolerated well without complaints.   · Interdisciplinary Collaboration:  · Occupational Therapist  · Registered Nurse  ·   · After treatment position/precautions:  · Supine in bed  · Bed alarm/tab alert on  · Bed/Chair-wheels locked  · Bed in low position  · Caregiver at bedside  · Call light within reach  · RN notified  · Family at bedside  · Side rails x 3  · Compliance with Program/Exercises: Will assess as treatment progresses. · Recommendations/Intent for next treatment session: \"Next visit will focus on advancements to more challenging activities and reduction in assistance provided\".   Total Treatment Duration:  OT Patient Time In/Time Out  Time In: 1415  Time Out: [address]

## 2017-03-27 NOTE — PROGRESS NOTES
END OF SHIFT NOTE:    INTAKE/OUTPUT  03/26 0701 - 03/27 0700  In: -   Out: 250 [Urine:250]  Voiding: YES  Catheter: NO  Drain:              Flatus: Patient does not have flatus present. Stool:  0 occurrences. Characteristics:       Emesis: 0 occurrences. Characteristics:        VITAL SIGNS  Patient Vitals for the past 12 hrs:   Temp Pulse Resp BP SpO2   03/26/17 2149 98.3 °F (36.8 °C) 82 18 118/72 94 %   03/26/17 2030 98.6 °F (37 °C) 80 18 132/71 92 %   03/26/17 1658 - 77 18 131/69 91 %       Pain Assessment  Pain Intensity 1: 7 (03/26/17 1612)  Pain Location 1: Neck     Patient Stated Pain Goal: 0    Ambulating  No but is standing at side of bed with moderate assist of 1 to void. Not sleeping. Confused about time frame and where he is. Shift report given to oncoming nurse at the bedside.     Artist JOHANN Guadalupe

## 2017-03-27 NOTE — ED NOTES
TRANSFER - OUT REPORT:    Verbal report given to Marvin Egan RN on Hellen Day  being transferred to 201-01 for routine progression of care       Report consisted of patients Situation, Background, Assessment and   Recommendations(SBAR). Information from the following report(s) SBAR, ED Summary, [facility] and Recent Results was reviewed with the receiving nurse. Lines:       Opportunity for questions and clarification was provided.       Patient transported with:   Tech   O2 @ 2L NC

## 2017-03-27 NOTE — H&P
HOSPITALIST H&P/CONSULT  NAME:  Lisset Colon   Age:  80 y.o.  :   1926   MRN:   [id number]  PCP: Liset Veras MD  Consulting MD:  Treatment Team: Attending Provider: Everardo Fuentes MD; Primary Nurse: Leeanna Enriquez RN  HPI:   Patient is a 80 yom with a hx of systolic chf EFof 20-31%, htn, anxiety who presents with increasing debility over the past 7-8 days. Son at bedside states pt has not been able to ambulate which he normally does using a cane. Denies any cp, sob, nausea, vomiting, recent fevers/chills. Pt unable to  ER to evaluate gait. Complete ROS done and is as stated in HPI or otherwise negative  Past Medical History:   Diagnosis Date    Anxiety     Congestive heart failure (CHF) ([address]     Depression     Hemorrhoids     HTN (hypertension), benign     Kidney stones     [facility] infarction [facility] [address]     Stroke [facility])       Past Surgical History:   Procedure Laterality Date    HX ANGIOPLASTY      HX CATARACT REMOVAL Bilateral 2012    HX CHOLECYSTECTOMY  1992    HX CORONARY ARTERY BYPASS GRAFT  2008    HX HEENT  [id number]    sinus    HX LITHOTRIPSY  late 's    HX OTHER SURGICAL  2013    skin cancer removed    HX PACEMAKER PLACEMENT      HX TONSILLECTOMY  1930's      Prior to Admission Medications   Prescriptions Last Dose Informant Patient Reported? Taking? apixaban (ELIQUIS) 2.5 mg tablet   No No   Sig: Take 1 Tab by mouth two (2) times a day. aspirin delayed-release 81 mg tablet   Yes No   Sig: Take 81 mg by mouth daily. carvedilol (COREG) 3.125 mg tablet   No No   Sig: Take 1 Tab by mouth two (2) times daily (with meals). cetirizine (ZYRTEC) 10 mg tablet   Yes No   Sig: Take  by mouth. cholecalciferol (VITAMIN D3) 1,000 unit cap   Yes No   Sig: Take 1,000 Units by mouth daily. fluticasone (FLONASE) 50 mcg/actuation nasal spray   Yes No   Si Sprays by Both Nostrils route daily.    furosemide (LASIX) 40 mg tablet   No No Sig: Take 1 Tab by mouth as needed. mirtazapine (REMERON) 15 mg tablet   No No   Sig: Take 1 Tab by mouth nightly. pravastatin (PRAVACHOL) 20 mg tablet   No No   Sig: Take 1 Tab by mouth nightly. Facility-Administered Medications: None     Allergies   Allergen Reactions    Niacin Unknown (comments)    Nitroglycerin Unknown (comments)      Social History   Substance Use Topics    Smoking status: Never Smoker    Smokeless tobacco: Never Used    Alcohol use No      Family History   Problem Relation Age of Onset    Breast Cancer Mother     Heart Disease Father     Heart Attack Father     Glaucoma Sister     Coronary Artery Disease Neg Hx       Objective:     Visit Vitals    /71 (BP 1 Location: Left arm, BP Patient Position: At rest)    Pulse 80    Temp 98.6 °F (37 °C)    Resp 18    Ht 5' 9\" (1.753 m)    Wt 78.9 kg (174 lb)    SpO2 92%    BMI 25.7 kg/m2      Temp (24hrs), Av.6 °F (37 °C), Min:98.5 °F (36.9 °C), Max:98.6 °F (37 °C)    Oxygen Therapy  O2 Sat (%): 92 % (17)  Pulse via Oximetry: 79 beats per minute (17)  O2 Device: Nasal cannula (17)  O2 Flow Rate (L/min): 2 l/min (17)  Physical Exam:  General:    Alert, cooperative, no distress, appears stated age. Head:   Normocephalic, without obvious abnormality, atraumatic. Nose:  Nares normal. No drainage or sinus tenderness. Lungs:   Clear to auscultation bilaterally. No Wheezing or Rhonchi. No rales. Heart:   Regular rate and rhythm,  no murmur, rub or gallop. Abdomen:   Soft, non-tender. Not distended. Bowel sounds normal.   Extremities: No cyanosis. No edema. No clubbing  Skin:     Texture, turgor dry. Lump on rt side of neck, mobile and nontender.   Not Jaundiced  Neurologic: Alert and oriented x 3, no focal deficits   Data Review:   Recent Results (from the past 24 hour(s))   CBC WITH AUTOMATED DIFF    Collection Time: 17  3:15 PM   Result Value Ref Range    WBC 6.7 4.3 - 11.1 K/uL    RBC 4.60 4.23 - 5.67 M/uL    HGB 14.6 13.6 - 17.2 g/dL    HCT 43.0 41.1 - 50.3 %    MCV 93.5 79.6 - 97.8 FL    MCH 31.7 26.1 - 32.9 PG    MCHC 34.0 31.4 - 35.0 g/dL    RDW 13.1 11.9 - 14.6 %    PLATELET 857 (L) 973 - 450 K/uL    MPV 10.6 (L) 10.8 - 14.1 FL    DF AUTOMATED      NEUTROPHILS 52 43 - 78 %    LYMPHOCYTES 38 13 - 44 %    MONOCYTES 9 4.0 - 12.0 %    EOSINOPHILS 1 0.5 - 7.8 %    BASOPHILS 0 0.0 - 2.0 %    IMMATURE GRANULOCYTES 0.1 0.0 - 5.0 %    ABS. NEUTROPHILS 3.5 1.7 - 8.2 K/UL    ABS. LYMPHOCYTES 2.5 0.5 - 4.6 K/UL    ABS. MONOCYTES 0.6 0.1 - 1.3 K/UL    ABS. EOSINOPHILS 0.0 0.0 - 0.8 K/UL    ABS. BASOPHILS 0.0 0.0 - 0.2 K/UL    ABS. IMM. GRANS. 0.0 0.0 - 0.5 K/UL   METABOLIC PANEL, COMPREHENSIVE    Collection Time: 03/26/17  3:15 PM   Result Value Ref Range    Sodium 139 136 - 145 mmol/L    Potassium 3.9 3.5 - 5.1 mmol/L    Chloride 104 98 - 107 mmol/L    CO2 24 21 - 32 mmol/L    Anion gap 11 7 - 16 mmol/L    Glucose 80 65 - 100 mg/dL    BUN 25 (H) 8 - 23 MG/DL    Creatinine 1.76 (H) 0.8 - 1.5 MG/DL    GFR est AA 47 (L) >60 ml/min/1.73m2    GFR est non-AA 39 (L) >60 ml/min/1.73m2    Calcium 9.5 8.3 - 10.4 MG/DL    Bilirubin, total 0.8 0.2 - 1.1 MG/DL    ALT (SGPT) 19 12 - 65 U/L    AST (SGOT) 60 (H) 15 - 37 U/L    Alk.  phosphatase 227 (H) 50 - 136 U/L    Protein, total 7.7 6.3 - 8.2 g/dL    Albumin 3.0 (L) 3.2 - 4.6 g/dL    Globulin 4.7 (H) 2.3 - 3.5 g/dL    A-G Ratio 0.6 (L) 1.2 - 3.5     SED RATE, AUTOMATED    Collection Time: 03/26/17  3:15 PM   Result Value Ref Range    Sed rate, automated 87 (H) 0 - 30 mm/hr   BNP    Collection Time: 03/26/17  3:15 PM   Result Value Ref Range     pg/mL   EKG, 12 LEAD, INITIAL    Collection Time: 03/26/17  4:08 PM   Result Value Ref Range    Ventricular Rate 77 BPM    Atrial Rate 77 BPM    P-R Interval 222 ms    QRS Duration 204 ms    Q-T Interval 470 ms    QTC Calculation (Bezet) 531 ms    Calculated P Axis 35 degrees    Calculated R Axis -102 degrees    Calculated T Axis 77 degrees    Diagnosis       !! AGE AND GENDER SPECIFIC ECG ANALYSIS !! Sinus rhythm with 1st degree A-V block with Possible Premature atrial   complexes with Aberrant conduction  Non-specific intra-ventricular conduction block  Possible Lateral infarct , age undetermined  Inferior infarct , age undetermined  Abnormal ECG  No previous ECGs available     URINE MICROSCOPIC    Collection Time: 03/26/17  7:34 PM   Result Value Ref Range    WBC 0-3 0 /hpf    RBC 0-3 0 /hpf    Epithelial cells 0-3 0 /hpf    Bacteria 0 0 /hpf    Casts 0-3 0 /lpf     Imaging /Procedures /Studies     Assessment and Plan:      Active Hospital Problems    Diagnosis Date Noted    Depression 03/22/2017    Debility 02/06/2017    HTN (hypertension), benign        PLAN  ·  admit to observation  · d5 1/2 ns @ 50 cc/hr  · PT to eval in am  · 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

## 2017-03-27 NOTE — PROGRESS NOTES
Hospitalist Progress Note    3/27/2017  Admit Date: 3/26/2017  3:03 PM   NAME: Nahun Yap   :  1926   MRN:  [id number]   Attending: Leda Lopez MD  PCP:  Gemma Shoemaker MD    SUBJECTIVE:    Patient is a 80 yom with a hx of systolic chf EFof 93-05%, htn, anxiety who presents with increasing debility over the past 7-8 days. Son at bedside states pt has not been able to ambulate which he normally does using a cane. CT head non acute. Does have distant hx of CVA per son with mild residual left sided facial droop and weakness on occasion per sons report. 3-27 - pt sleeping on arrival. Arousable but still lethargic. No specific complaints. Son at bedside- questions answered. He does mention night-time agitation, increasing difficulty with short term memory and behavioral disturbances in last months. Review of Systems negative with exception of pertinent positives noted above  PHYSICAL EXAM     Visit Vitals    /64 (BP 1 Location: Left arm, BP Patient Position: Supine)    Pulse 74    Temp 98.1 °F (36.7 °C)    Resp 16    Ht 5' 9\" (1.753 m)    Wt 78.9 kg (174 lb)    SpO2 91%    BMI 25.7 kg/m2      Temp (24hrs), Av.4 °F (36.9 °C), Min:98.1 °F (36.7 °C), Max:98.6 °F (37 °C)    Oxygen Therapy  O2 Sat (%): 91 % (17 1515)  Pulse via Oximetry: 79 beats per minute (17 2030)  O2 Device: Nasal cannula (17 0910)  O2 Flow Rate (L/min): 2 l/min (17 0910)    Intake/Output Summary (Last 24 hours) at 17 1807  Last data filed at 17 1515   Gross per 24 hour   Intake             1379 ml   Output              250 ml   Net             1129 ml      General: No acute distress, lethargic and oriented x 2  Lungs:  CTA Bilaterally. Heart:  Regular rate and rhythm,  No murmur, rub, or gallop  Abdomen: Soft, Non distended, Non tender, Positive bowel sounds  Extremities: No cyanosis, clubbing or edema  Neurologic:  LLE 4/5, 5/5 motor x 3 other ext. ASSESSMENT      Active Hospital Problems    Diagnosis Date Noted    Depression 03/22/2017    Debility 02/06/2017    HTN (hypertension), benign      A/P  - Dementia -suspected, mild/mod. CT head non acute  - HTN - controlled  - Debility - PT/OT following. Able to ambulate short distances today. - Chronic systolic CHF - compensated.  Cont eliquis,  And rate control    DVT Prophylaxis: eliquis    Dispo - Obs status only [facility]     Signed By: Julissa Farooq DO     March 27, 2017

## 2017-03-27 NOTE — PROGRESS NOTES
TRANSFER - IN REPORT:    Verbal report received from Kusum(name) on Neel Lee  being received from ER(unit) for routine progression of care      Report consisted of patients Situation, Background, Assessment and   Recommendations(SBAR). Information from the following report(s) ED Summary was reviewed with the receiving nurse. Opportunity for questions and clarification was provided. Assessment completed upon patients arrival to unit and care assumed. Arrived awake, alert, oriented to name [employer]. Very [place]. Son with pt and helping him with details. No skin breakdown but has multiple bilateral vein varicosities. Son requests remeron not be given because pt used it twice with bad nightmares both nights and did not rest well with it.

## 2017-03-27 NOTE — ROUTINE PROCESS
Spoke with Dr Eulalio Stephens. Pt's son requesting pt to be started back on his metamucil.   Order received

## 2017-03-28 NOTE — PROGRESS NOTES
Discharge instructions and prescriptions given and reviewed with pt, verbalizes understanding, medication side effect sheet reviewed with pt, pt discharged home with family. Patient has to see Home Health prior to discharge.

## 2017-03-28 NOTE — PROGRESS NOTES
Pt has rested quietly during the nightshift. Family at bedside. oob to bsc x2. Pleasantly confused at times. ivf infusing without diff. sr up x3, bed lowered and locked, and call light within reach.

## 2017-03-28 NOTE — PROGRESS NOTES
Spoke to Mr. Claude Rosa, his son, and daughter in law in room 221. Agreed to [facility] RN, OT, and PT, and discussed that Mr. Claude Rosa may also need some part-time, paid caregivers.

## 2017-03-28 NOTE — PROGRESS NOTES
[facility] - INPATIENT  Face to Face Encounter    Patients Name: Nahun Yap    YOB: 1926    Ordering Physician: Dr. Holland De Dios    Primary Diagnosis: general weakness    Date of Face to Face:   3/28/2017                                  Face to Face Encounter findings are related to primary reason for home care:   yes. 1. I certify that the patient needs intermittent care as follows: skilled nursing care:  teaching/training of new meds, CHF mgt  physical therapy: strengthening, transfer training and pt/caregiver education  occupational therapy:  ADL safety (ie. cooking, bathing, dressing), ROM and pt/caregiver education    2. I certify that this patient is homebound, that is: 1) patient requires the use of a cane device, special transportation, or assistance of another to leave the home; or 2) patient's condition makes leaving the home medically contraindicated; and 3) patient has a normal inability to leave the home and leaving the home requires considerable and taxing effort. Patient may leave the home for infrequent and short duration for medical reasons, and occasional absences for non-medical reasons. Homebound status is due to the following functional limitations: Patient with increased shortness of breath and elevated heart rate with ambulation greater than 20 feet limiting patient's ability to ambulate safely within the community. 3. I certify that this patient is under my care and that I, or a nurse practitioner or [phone], or clinical nurse specialist, or certified nurse midwife, working with me, had a Face-to-Face Encounter that meets the physician Face-to-Face Encounter requirements.   The following are the clinical findings from the [address] [place] [address] encounter that support the need for skilled services and is a summary of the encounter: see discharge summary/progress notes/chart    See discharge summary/progress notes/chart      Florence Cooper RN  3/28/2017      THE FOLLOWING TO BE COMPLETED BY THE COMMUNITY PHYSICIAN:    I concur with the findings described above from the F2F encounter that this patient is homebound and in need of a skilled service.     Certifying Physician: _____________________________________      Printed Certifying Physician Name: _____________________________________    Date: _________________

## 2017-03-28 NOTE — DISCHARGE INSTRUCTIONS
DISCHARGE SUMMARY from Nurse    The following personal items are in your possession at time of discharge:    Dental Appliances: With patient  Visual Aid: Glasses  Hearing Aids/Status: Bilateral, With patient  Home Medications: None  Jewelry: None  Clothing: At bedside  Other Valuables: Other (comment) (bilateral hearing aids)             PATIENT INSTRUCTIONS:    After general anesthesia or intravenous sedation, for 24 hours or while taking prescription Narcotics:  · Limit your activities  · Do not drive and operate hazardous machinery  · Do not make important personal or business decisions  · Do  not drink alcoholic beverages  · If you have not urinated within 8 hours after discharge, please contact your surgeon on call. Report the following to your surgeon:  · Excessive pain, swelling, redness or odor of or around the surgical area  · Temperature over 100.5  · Nausea and vomiting lasting longer than 4 hours or if unable to take medications  · Any signs of decreased circulation or nerve impairment to extremity: change in color, persistent  numbness, tingling, coldness or increase pain  · Any questions        What to do at Home:  Recommended activity: Activity as tolerated, per MD instructions    If you experience any of the following symptoms fever > 100.5, nausea, vomiting, pain, chest pain, shortness of breath please follow up with MD.      *  Please give a list of your current medications to your Primary Care Provider. *  Please update this list whenever your medications are discontinued, doses are      changed, or new medications (including over-the-counter products) are added. *  Please carry medication information at all times in case of emergency situations.           These are general instructions for a healthy lifestyle:    No smoking/ No tobacco products/ Avoid exposure to second hand smoke    Surgeon General's Warning:  Quitting smoking now greatly reduces serious risk to your health. Obesity, smoking, and sedentary lifestyle greatly increases your risk for illness    A healthy diet, regular physical exercise & weight monitoring are important for maintaining a healthy lifestyle    You may be retaining fluid if you have a history of heart failure or if you experience any of the following symptoms:  Weight gain of 3 pounds or more overnight or 5 pounds in a week, increased swelling in our hands or feet or shortness of breath while lying flat in bed. Please call your doctor as soon as you notice any of these symptoms; do not wait until your next office visit. Recognize signs and symptoms of STROKE:    F-face looks uneven    A-arms unable to move or move unevenly    S-speech slurred or non-existent    T-time-call 911 as soon as signs and symptoms begin-DO NOT go       Back to bed or wait to see if you get better-TIME IS BRAIN. Warning Signs of HEART ATTACK     Call 911 if you have these symptoms:   Chest discomfort. Most heart attacks involve discomfort in the center of the chest that lasts more than a few minutes, or that goes away and comes back. It can feel like uncomfortable pressure, squeezing, fullness, or pain.  Discomfort in other areas of the upper body. Symptoms can include pain or discomfort in one or both arms, the back, neck, jaw, or stomach.  Shortness of breath with or without chest discomfort.  Other signs may include breaking out in a cold sweat, nausea, or lightheadedness. Don't wait more than five minutes to call 911 - MINUTES MATTER! Fast action can save your life. Calling 911 is almost always the fastest way to get lifesaving treatment. Emergency Medical Services staff can begin treatment when they arrive -- up to an hour sooner than if someone gets to the hospital by car. The discharge information has been reviewed with the patient. The patient verbalized understanding.     Discharge medications reviewed with the patient and appropriate educational materials and side effects teaching were provided. Weakness: Care Instructions  Your Care Instructions  Weakness is a lack of physical or muscle strength. You may feel that you need to make extra effort to move your arms, legs, or other muscles. Generalized weakness means that you feel weak in most areas of your body. Another type of weakness may affect just one muscle or group of muscles. You may feel weak and tired after you have done too much activity, such as taking an extra-long hike. This is not a serious problem. It often goes away on its own. Feeling weak can also be caused by medical conditions like thyroid problems, depression, or a virus. Sometimes the cause can be serious. Your doctor may want to do more tests to try to find the cause of the weakness. The doctor has checked you carefully, but problems can develop later. If you notice any problems or new symptoms, get medical treatment right away. Follow-up care is a key part of your treatment and safety. Be sure to make and go to all appointments, and call your doctor if you are having problems. It's also a good idea to know your test results and keep a list of the medicines you take. How can you care for yourself at home? · Rest when you feel tired. · Be safe with medicines. If your doctor prescribed medicine, take it exactly as prescribed. Call your doctor if you think you are having a problem with your medicine. You will get more details on the specific medicines your doctor prescribes. · Do not skip meals. Eating a balanced diet may increase your energy level. · Get some physical activity every day, but do not get too tired. When should you call for help? Call your doctor now or seek immediate medical care if:  · You have new or worse weakness. · You are dizzy or lightheaded, or you feel like you may faint.   Watch closely for changes in your health, and be sure to contact your doctor if:  · You do not get better as expected. Where can you learn more? Go to http://mauricio-niles.info/. Enter [id number] [id number] in the search box to learn more about \"Weakness: Care Instructions. \"  Current as of: May 27, 2016  Content Version: 11.1  © 1217-5260 [employer]. Care instructions adapted under license by [employer] (which disclaims liability or warranty for this information). If you have questions about a medical condition or this instruction, always ask your healthcare professional. [place] [address] any warranty or liability for your use of this information.

## 2017-03-28 NOTE — DISCHARGE SUMMARY
Hospitalist Discharge Summary     Patient ID:  Quinton Mccoy  [id number]  54 y.o.  6/27/1926  Admit date: 3/26/2017  3:03 PM  Discharge date and time: 3/28/2017  Attending: Natalya Armendariz MD  PCP:  Alexi Pérez MD  Treatment Team: Attending Provider: Natalya Armendariz MD; Utilization Review: Anika Gavin; Care Manager: Yessy Kelly RN; Student Nurse: Ronald Savage    Principal Diagnosis Debility   Principal Problem:    Debility (2/6/2017)    Active Problems:    HTN (hypertension), benign ()      Depression (3/22/2017)             Hospital Course:  Please refer to the admission H&P for details of presentation. In summary, the patient is a 80year old CM with a PMH of chronic sCHF, h/o strokes with residual left sided weakness, and probable vascular dementia that presented to the ER with about a week of confusion at night with insomnia and one day of LLE weakness and the inability to ambulate. He was admitted to the hospital and PT was started which he ambulated with a walker. CT head and infectious work up was negative to rule out metabolic issues. The patient was discharged home in stable condition to work with home health PT/OT/nursing. He will follow up with his PCP in one week.     Significant Diagnostic Studies:       Labs: Results:       Chemistry Recent Labs      03/28/17   0516  03/27/17   0537  03/26/17   1515   GLU  76  77  80   NA  139  141  139   K  3.8  4.0  3.9   CL  104  107  104   CO2  22  23  24   BUN  20  22  25*   CREA  1.52*  1.63*  1.76*   CA  9.2  9.0  9.5   AGAP  13  11  11   AP   --    --   227*   TP   --    --   7.7   ALB   --    --   3.0*   GLOB   --    --   4.7*   AGRAT   --    --   0.6*      CBC w/Diff Recent Labs      03/28/17   0516  03/26/17   1515   WBC  8.2  6.7   RBC  4.61  4.60   HGB  14.5  14.6   HCT  43.2  43.0   PLT  125*  137*   GRANS   --   52   LYMPH   --   38   EOS   --   1      Cardiac Enzymes No results for input(s): CPK, CKND1, MALISSA in the last 72 hours. No lab exists for component: CKRMB, TROIP   Coagulation No results for input(s): PTP, INR, APTT in the last 72 hours. No lab exists for component: INREXT    Lipid Panel No results found for: CHOL, CHOLPOCT, CHOLX, CHLST, CHOLV, [id number], HDL, LDL, NLDLCT, DLDL, LDLC, DLDLP, [id number], VLDLC, VLDL, TGL, TGLX, TRIGL, [id number], TRIGP, TGLPOCT, [id number], CHHD, CHHDX   BNP No results for input(s): BNPP in the last 72 hours. Liver Enzymes Recent Labs      03/26/17   1515   TP  7.7   ALB  3.0*   AP  227*   SGOT  60*      Thyroid Studies Lab Results   Component Value Date/Time    TSH 1.560 03/22/2017 02:21 PM            Discharge Exam:  Visit Vitals    /58 (BP 1 Location: Left arm, BP Patient Position: At rest)    Pulse 65    Temp 98.1 °F (36.7 °C)    Resp 17    Ht 5' 9\" (1.753 m)    Wt 76.4 kg (168 lb 6.4 oz)    SpO2 (!) 87%    BMI 24.87 kg/m2     General appearance: alert, cooperative, no distress, appears stated age  Lungs: clear to auscultation bilaterally  Heart: regular rate and rhythm, S1, S2 normal, no murmur, click, rub or gallop  Abdomen: soft, non-tender. Bowel sounds normal. No masses,  no organomegaly  Extremities: no cyanosis or edema  Neurologic: Grossly normal    Disposition: home  Discharge Condition: stable  Patient Instructions:   Current Discharge Medication List      CONTINUE these medications which have NOT CHANGED    Details   apixaban (ELIQUIS) 2.5 mg tablet Take 1 Tab by mouth two (2) times a day. Qty: 60 Tab, Refills: 11    Associated Diagnoses: Paroxysmal atrial fibrillation (HCC)      pravastatin (PRAVACHOL) 20 mg tablet Take 1 Tab by mouth nightly. Qty: 90 Tab, Refills: 3    Associated Diagnoses: Coronary artery disease involving coronary bypass graft of native heart, angina presence unspecified      carvedilol (COREG) 3.125 mg tablet Take 1 Tab by mouth two (2) times daily (with meals).   Qty: 180 Tab, Refills: 3      cetirizine (ZYRTEC) 10 mg tablet Take  by mouth.      cholecalciferol (VITAMIN D3) 1,000 unit cap Take 1,000 Units by mouth daily. aspirin delayed-release 81 mg tablet Take 81 mg by mouth daily. furosemide (LASIX) 40 mg tablet Take 1 Tab by mouth as needed.   Qty: 30 Tab, Refills: 6    Associated Diagnoses: Cardiomyopathy, ischemic         STOP taking these medications       mirtazapine (REMERON) 15 mg tablet Comments:   Reason for Stopping:               Activity: Activity as tolerated  Diet: Cardiac Diet  Wound Care: None needed    Follow-up  ·   Dr. Jose Enrique Castillo in 3-5 days  Time spent to discharge patient 35 minutes  Signed:  Gregoria Scott DO  3/28/2017  9:55 AM

## 2017-03-30 PROBLEM — G45.9 TIA (TRANSIENT ISCHEMIC ATTACK): Status: ACTIVE | Noted: 2017-01-01

## 2017-04-09 PROCEDURE — 3331090001 HH PPS REVENUE CREDIT

## 2017-04-09 PROCEDURE — 3331090002 HH PPS REVENUE DEBIT

## 2017-04-10 PROCEDURE — 3331090002 HH PPS REVENUE DEBIT

## 2017-04-10 PROCEDURE — 3331090001 HH PPS REVENUE CREDIT

## 2017-04-11 PROCEDURE — 3331090001 HH PPS REVENUE CREDIT

## 2017-04-11 PROCEDURE — 3331090002 HH PPS REVENUE DEBIT

## 2017-04-12 PROCEDURE — 3331090001 HH PPS REVENUE CREDIT

## 2017-04-12 PROCEDURE — 3331090002 HH PPS REVENUE DEBIT

## 2017-04-13 PROCEDURE — 3331090002 HH PPS REVENUE DEBIT

## 2017-04-13 PROCEDURE — 3331090001 HH PPS REVENUE CREDIT

## 2017-04-14 PROCEDURE — 3331090002 HH PPS REVENUE DEBIT

## 2017-04-14 PROCEDURE — 3331090001 HH PPS REVENUE CREDIT

## 2017-04-15 PROCEDURE — 3331090001 HH PPS REVENUE CREDIT

## 2017-04-15 PROCEDURE — 3331090002 HH PPS REVENUE DEBIT

## 2017-04-16 PROCEDURE — 3331090001 HH PPS REVENUE CREDIT

## 2017-04-16 PROCEDURE — 3331090002 HH PPS REVENUE DEBIT

## 2017-04-17 PROCEDURE — 3331090002 HH PPS REVENUE DEBIT

## 2017-04-17 PROCEDURE — 3331090001 HH PPS REVENUE CREDIT

## 2017-04-18 PROCEDURE — 3331090002 HH PPS REVENUE DEBIT

## 2017-04-18 PROCEDURE — 3331090001 HH PPS REVENUE CREDIT

## 2017-04-19 PROCEDURE — 3331090001 HH PPS REVENUE CREDIT

## 2017-04-19 PROCEDURE — 3331090002 HH PPS REVENUE DEBIT

## 2017-04-20 PROCEDURE — 3331090002 HH PPS REVENUE DEBIT

## 2017-04-20 PROCEDURE — 3331090001 HH PPS REVENUE CREDIT

## 2017-04-21 PROCEDURE — 3331090002 HH PPS REVENUE DEBIT

## 2017-04-21 PROCEDURE — 3331090001 HH PPS REVENUE CREDIT

## 2017-04-22 PROCEDURE — 3331090001 HH PPS REVENUE CREDIT

## 2017-04-22 PROCEDURE — 3331090002 HH PPS REVENUE DEBIT

## 2017-04-23 PROCEDURE — 3331090001 HH PPS REVENUE CREDIT

## 2017-04-23 PROCEDURE — 3331090002 HH PPS REVENUE DEBIT

## 2017-04-24 PROCEDURE — 3331090001 HH PPS REVENUE CREDIT

## 2017-04-24 PROCEDURE — 3331090002 HH PPS REVENUE DEBIT

## 2017-04-25 PROCEDURE — 3331090001 HH PPS REVENUE CREDIT

## 2017-04-25 PROCEDURE — 3331090002 HH PPS REVENUE DEBIT

## 2017-04-26 PROCEDURE — 3331090002 HH PPS REVENUE DEBIT

## 2017-04-26 PROCEDURE — 3331090001 HH PPS REVENUE CREDIT

## 2017-04-27 PROCEDURE — 3331090001 HH PPS REVENUE CREDIT

## 2017-04-27 PROCEDURE — 3331090002 HH PPS REVENUE DEBIT

## 2017-04-28 PROCEDURE — 3331090002 HH PPS REVENUE DEBIT

## 2017-04-28 PROCEDURE — 3331090001 HH PPS REVENUE CREDIT

## 2017-04-29 PROCEDURE — 3331090001 HH PPS REVENUE CREDIT

## 2017-04-29 PROCEDURE — 3331090002 HH PPS REVENUE DEBIT

## 2017-04-30 PROCEDURE — 3331090002 HH PPS REVENUE DEBIT

## 2017-04-30 PROCEDURE — 3331090001 HH PPS REVENUE CREDIT

## 2017-05-01 PROCEDURE — 3331090002 HH PPS REVENUE DEBIT

## 2017-05-01 PROCEDURE — 3331090001 HH PPS REVENUE CREDIT

## 2017-05-02 PROCEDURE — 3331090001 HH PPS REVENUE CREDIT

## 2017-05-02 PROCEDURE — 3331090002 HH PPS REVENUE DEBIT

## 2017-05-03 PROCEDURE — 3331090001 HH PPS REVENUE CREDIT

## 2017-05-03 PROCEDURE — 3331090002 HH PPS REVENUE DEBIT
